# Patient Record
Sex: FEMALE | Race: WHITE | URBAN - METROPOLITAN AREA
[De-identification: names, ages, dates, MRNs, and addresses within clinical notes are randomized per-mention and may not be internally consistent; named-entity substitution may affect disease eponyms.]

---

## 2021-11-29 ENCOUNTER — OFFICE VISIT (OUTPATIENT)
Dept: URBAN - METROPOLITAN AREA CLINIC 128 | Facility: CLINIC | Age: 81
End: 2021-11-29

## 2022-08-31 ENCOUNTER — LAB OUTSIDE AN ENCOUNTER (OUTPATIENT)
Dept: URBAN - METROPOLITAN AREA CLINIC 128 | Facility: CLINIC | Age: 82
End: 2022-08-31

## 2022-08-31 ENCOUNTER — OFFICE VISIT (OUTPATIENT)
Dept: URBAN - METROPOLITAN AREA CLINIC 128 | Facility: CLINIC | Age: 82
End: 2022-08-31

## 2022-08-31 ENCOUNTER — WEB ENCOUNTER (OUTPATIENT)
Dept: URBAN - METROPOLITAN AREA CLINIC 128 | Facility: CLINIC | Age: 82
End: 2022-08-31

## 2022-08-31 ENCOUNTER — CLAIMS CREATED FROM THE CLAIM WINDOW (OUTPATIENT)
Dept: URBAN - METROPOLITAN AREA CLINIC 128 | Facility: CLINIC | Age: 82
End: 2022-08-31
Payer: MEDICARE

## 2022-08-31 VITALS
DIASTOLIC BLOOD PRESSURE: 76 MMHG | HEIGHT: 63 IN | WEIGHT: 89 LBS | HEART RATE: 90 BPM | SYSTOLIC BLOOD PRESSURE: 154 MMHG | BODY MASS INDEX: 15.77 KG/M2 | TEMPERATURE: 98.1 F

## 2022-08-31 DIAGNOSIS — K58.9 IRRITABLE BOWEL SYNDROME, UNSPECIFIED TYPE: ICD-10-CM

## 2022-08-31 DIAGNOSIS — R19.7 DIARRHEA, UNSPECIFIED TYPE: ICD-10-CM

## 2022-08-31 PROCEDURE — 99204 OFFICE O/P NEW MOD 45 MIN: CPT | Performed by: PHYSICIAN ASSISTANT

## 2022-08-31 RX ORDER — CHOLESTYRAMINE 4 G/9G
1 PACKET MIXED WITH WATER OR NON-CARBONATED DRINK POWDER, FOR SUSPENSION ORAL TWICE A DAY
Qty: 60 | Refills: 2 | OUTPATIENT
Start: 2022-08-31

## 2022-08-31 RX ORDER — RIFAXIMIN 550 MG/1
1 TABLET TABLET ORAL THREE TIMES A DAY
Qty: 42 TABLET | Refills: 0 | OUTPATIENT
Start: 2022-08-31 | End: 2022-09-14

## 2022-08-31 NOTE — HPI-TODAY'S VISIT:
The patient is a new patient with known IBS-D. She has 1-2 loose BMs a day. She tested positive for SIBO with her PCP but symptoms returned after taking flagyl and bactim for her SIBO. She had negative stool studies per patient. She went to [place] 1 month ago so her pcp did a stool study. She has had giardiasis in the remote past after going to [facility]. her last colonoscopy was at age 74. She had a negative cologuard test in 12/21. She had an overall negative food allergy panel except for mild shrimp allergy.

## 2022-09-01 LAB
A/G RATIO: 1.7
ABSOLUTE BASOPHILS: 38
ABSOLUTE EOSINOPHILS: 360
ABSOLUTE LYMPHOCYTES: 2115
ABSOLUTE MONOCYTES: 848
ABSOLUTE NEUTROPHILS: 4140
ALBUMIN: 4.2
ALKALINE PHOSPHATASE: 43
ALT (SGPT): 18
AST (SGOT): 28
BASOPHILS: 0.5
BILIRUBIN, TOTAL: 0.6
BUN/CREATININE RATIO: 40
BUN: 22
CALCIUM: 10.3
CARBON DIOXIDE, TOTAL: 27
CHLORIDE: 103
CREATININE: 0.55
EGFR: 91
EOSINOPHILS: 4.8
GLOBULIN, TOTAL: 2.5
GLUCOSE: 85
HEMATOCRIT: 38.9
HEMOGLOBIN: 13.1
IMMUNOGLOBULIN A: 214
INTERPRETATION: (no result)
LYMPHOCYTES: 28.2
MCH: 35.3
MCHC: 33.7
MCV: 104.9
MONOCYTES: 11.3
MPV: 10.4
NEUTROPHILS: 55.2
PLATELET COUNT: 222
POTASSIUM: 4.6
PROTEIN, TOTAL: 6.7
RDW: 12
RED BLOOD CELL COUNT: 3.71
SODIUM: 140
TISSUE TRANSGLUTAMINASE AB, IGA: <1
TSH: 1.18
WHITE BLOOD CELL COUNT: 7.5

## 2022-09-06 ENCOUNTER — P2P PATIENT RECORD (OUTPATIENT)
Age: 82
End: 2022-09-06

## 2022-09-08 ENCOUNTER — LAB OUTSIDE AN ENCOUNTER (OUTPATIENT)
Dept: URBAN - METROPOLITAN AREA CLINIC 128 | Facility: CLINIC | Age: 82
End: 2022-09-08

## 2022-09-14 LAB
CAMPYLOBACTER SPP. AG,EIA: (no result)
CLOSTRIDIUM DIFFICILE: NOT DETECTED
EIA (1): (no result)
FECAL FAT, QUALITATIVE: (no result)
LEUKOCYTES: (no result)
OVA AND PARASITES, CONC AND PERM SMEAR: (no result)
PANCREATIC ELASTASE, FECAL: 265
SALMONELLA AND SHIGELLA, CULTURE: (no result)
SHIGA TOXINS, EIA W/RFL TO E.COLI O157 CULTURE: (no result)
TOXIN A AND B: NOT DETECTED

## 2022-09-16 LAB
CAMPYLOBACTER SPP. AG,EIA: (no result)
CLOSTRIDIUM DIFFICILE: NOT DETECTED
EIA (1): (no result)
FECAL FAT, QUALITATIVE: (no result)
LEUKOCYTES: (no result)
OVA AND PARASITES, CONC AND PERM SMEAR: (no result)
PANCREATIC ELASTASE, FECAL: 283
SALMONELLA AND SHIGELLA, CULTURE: (no result)
SHIGA TOXINS, EIA W/RFL TO E.COLI O157 CULTURE: (no result)
TOXIN A AND B: NOT DETECTED

## 2022-10-25 ENCOUNTER — OFFICE VISIT (OUTPATIENT)
Dept: URBAN - METROPOLITAN AREA CLINIC 128 | Facility: CLINIC | Age: 82
End: 2022-10-25

## 2022-10-26 PROBLEM — 10743008: Status: ACTIVE | Noted: 2022-08-31

## 2022-10-27 ENCOUNTER — TELEPHONE ENCOUNTER (OUTPATIENT)
Dept: URBAN - METROPOLITAN AREA CLINIC 92 | Facility: CLINIC | Age: 82
End: 2022-10-27

## 2022-10-27 ENCOUNTER — CLAIMS CREATED FROM THE CLAIM WINDOW (OUTPATIENT)
Dept: URBAN - METROPOLITAN AREA TELEHEALTH 2 | Facility: TELEHEALTH | Age: 82
End: 2022-10-27
Payer: MEDICARE

## 2022-10-27 VITALS — WEIGHT: 98 LBS | BODY MASS INDEX: 17.36 KG/M2 | HEIGHT: 63 IN

## 2022-10-27 DIAGNOSIS — K58.9 IRRITABLE BOWEL SYNDROME, UNSPECIFIED TYPE: ICD-10-CM

## 2022-10-27 DIAGNOSIS — K58.0 IBS-D: ICD-10-CM

## 2022-10-27 DIAGNOSIS — R19.7 DIARRHEA, UNSPECIFIED TYPE: ICD-10-CM

## 2022-10-27 PROCEDURE — 99213 OFFICE O/P EST LOW 20 MIN: CPT | Performed by: PHYSICIAN ASSISTANT

## 2022-10-27 RX ORDER — CHOLESTYRAMINE 4 G/9G
1 PACKET MIXED WITH WATER OR NON-CARBONATED DRINK POWDER, FOR SUSPENSION ORAL TWICE A DAY
Qty: 60 | Refills: 2 | OUTPATIENT

## 2022-10-27 RX ORDER — CHOLESTYRAMINE 4 G/9G
1 PACKET MIXED WITH WATER OR NON-CARBONATED DRINK POWDER, FOR SUSPENSION ORAL TWICE A DAY
Qty: 60 | Refills: 2 | COMMUNITY
Start: 2022-08-31

## 2022-10-27 NOTE — HPI-TODAY'S VISIT:
The patient is following up on her IBS-D. She has 1 formed BMs a day now on the questran and s/p xifaxan treatment. She feels 100% better now. She tested positive for SIBO with her PCP but symptoms returned after taking flagyl and bactim for her SIBO. She had negative stool studies per patient. She went to [place] 1 month ago so her pcp did a stool study. She has had giardiasis in the remote past after going to [facility]. Her last colonoscopy was at age 74. She had a negative cologuard test in 12/21. She had an overall negative food allergy panel except for mild shrimp allergy.

## 2023-04-17 ENCOUNTER — TELEPHONE ENCOUNTER (OUTPATIENT)
Dept: URBAN - METROPOLITAN AREA CLINIC 19 | Facility: CLINIC | Age: 83
End: 2023-04-17

## 2023-04-17 RX ORDER — RIFAXIMIN 550 MG/1
1 TABLET TABLET ORAL THREE TIMES A DAY
Qty: 42 TABLET | Refills: 0 | OUTPATIENT
Start: 2023-04-17 | End: 2023-05-01

## 2023-04-21 ENCOUNTER — OFFICE VISIT (OUTPATIENT)
Dept: URBAN - METROPOLITAN AREA CLINIC 128 | Facility: CLINIC | Age: 83
End: 2023-04-21
Payer: MEDICARE

## 2023-04-21 VITALS
BODY MASS INDEX: 17.33 KG/M2 | WEIGHT: 97.8 LBS | HEIGHT: 63 IN | SYSTOLIC BLOOD PRESSURE: 150 MMHG | HEART RATE: 71 BPM | TEMPERATURE: 97.6 F | DIASTOLIC BLOOD PRESSURE: 68 MMHG

## 2023-04-21 DIAGNOSIS — K21.9 GASTROESOPHAGEAL REFLUX DISEASE WITHOUT ESOPHAGITIS: ICD-10-CM

## 2023-04-21 DIAGNOSIS — C50.819 MALIGNANT NEOPLASM OF OVERLAPPING SITES OF UNSPECIFIED FEMALE BREAST: ICD-10-CM

## 2023-04-21 DIAGNOSIS — K58.2 IRRITABLE BOWEL SYNDROME WITH ALTERNATING BOWEL HABITS: ICD-10-CM

## 2023-04-21 PROBLEM — 372064008: Status: ACTIVE | Noted: 2023-04-21

## 2023-04-21 PROBLEM — 266435005: Status: ACTIVE | Noted: 2023-04-21

## 2023-04-21 PROCEDURE — 99213 OFFICE O/P EST LOW 20 MIN: CPT | Performed by: PHYSICIAN ASSISTANT

## 2023-04-21 RX ORDER — CHOLESTYRAMINE 4 G/9G
1 PACKET MIXED WITH WATER OR NON-CARBONATED DRINK POWDER, FOR SUSPENSION ORAL TWICE A DAY
Qty: 60 | Refills: 2 | OUTPATIENT

## 2023-04-21 RX ORDER — RIFAXIMIN 550 MG/1
1 TABLET TABLET ORAL THREE TIMES A DAY
Qty: 42 TABLET | Refills: 0 | Status: ON HOLD | COMMUNITY
Start: 2023-04-17 | End: 2023-05-01

## 2023-04-21 RX ORDER — CHOLESTYRAMINE 4 G/9G
1 PACKET MIXED WITH WATER OR NON-CARBONATED DRINK POWDER, FOR SUSPENSION ORAL TWICE A DAY
Qty: 60 | Refills: 2 | Status: ACTIVE | COMMUNITY

## 2023-04-21 RX ORDER — FAMOTIDINE 20 MG/1
1 TABLET AT BEDTIME TABLET, FILM COATED ORAL ONCE A DAY
Qty: 30 TABLET | Refills: 5 | OUTPATIENT
Start: 2023-04-21

## 2023-04-21 NOTE — PHYSICAL EXAM GASTROINTESTINAL
Abdomen , soft, nontender, nondistended , no guarding or rigidity , no masses palpable , normal bowel sounds, negative Monroe's sign. negative CVA tenderness bilaterally Liver and Spleen , no hepatosplenomegaly Rectal deferred

## 2023-04-21 NOTE — HPI-TODAY'S VISIT:
The patient is following up on her IBS-D. She has 1 formed BMs a day now on the questran and s/p xifaxan treatment. She feels 100% better now. She tested positive for SIBO with her PCP but symptoms returned after taking flagyl and bactim for her SIBO. She had negative stool studies per patient. She went to [place] last year so her pcp did a stool study. She has had giardiasis in the remote past after going to [facility]. Her last colonoscopy was at age 74. She had a negative cologuard test in 12/21. She had an overall negative food allergy panel except for mild shrimp allergy. She was taking  omeprazole for acid reflux symptoms but then she stopped it.

## 2023-04-24 ENCOUNTER — TELEPHONE ENCOUNTER (OUTPATIENT)
Dept: URBAN - METROPOLITAN AREA CLINIC 128 | Facility: CLINIC | Age: 83
End: 2023-04-24

## 2023-04-24 RX ORDER — DICYCLOMINE HYDROCHLORIDE 10 MG/1
1 CAPSULE CAPSULE ORAL
Qty: 30 | Refills: 0 | OUTPATIENT
Start: 2023-04-26 | End: 2023-05-26

## 2023-05-30 ENCOUNTER — TELEPHONE ENCOUNTER (OUTPATIENT)
Dept: URBAN - METROPOLITAN AREA CLINIC 128 | Facility: CLINIC | Age: 83
End: 2023-05-30

## 2023-05-30 RX ORDER — CHOLESTYRAMINE 4 G/9G
1 PACKET MIXED WITH WATER OR NON-CARBONATED DRINK POWDER, FOR SUSPENSION ORAL ONCE A DAY
Qty: 30 | Refills: 2 | OUTPATIENT
Start: 2023-05-30

## 2023-06-13 ENCOUNTER — OFFICE VISIT (OUTPATIENT)
Dept: URBAN - METROPOLITAN AREA CLINIC 128 | Facility: CLINIC | Age: 83
End: 2023-06-13
Payer: MEDICARE

## 2023-06-13 ENCOUNTER — DASHBOARD ENCOUNTERS (OUTPATIENT)
Age: 83
End: 2023-06-13

## 2023-06-13 VITALS
BODY MASS INDEX: 17.47 KG/M2 | WEIGHT: 98.6 LBS | SYSTOLIC BLOOD PRESSURE: 136 MMHG | HEART RATE: 74 BPM | HEIGHT: 63 IN | DIASTOLIC BLOOD PRESSURE: 66 MMHG | TEMPERATURE: 97.7 F

## 2023-06-13 DIAGNOSIS — C50.919 MALIGNANT NEOPLASM OF FEMALE BREAST, UNSPECIFIED ESTROGEN RECEPTOR STATUS, UNSPECIFIED LATERALITY, UNSPECIFIED SITE OF BREAST: ICD-10-CM

## 2023-06-13 DIAGNOSIS — K58.8 OTHER IRRITABLE BOWEL SYNDROME: ICD-10-CM

## 2023-06-13 DIAGNOSIS — K21.9 GASTROESOPHAGEAL REFLUX DISEASE WITHOUT ESOPHAGITIS: ICD-10-CM

## 2023-06-13 DIAGNOSIS — R19.7 DIARRHEA, UNSPECIFIED TYPE: ICD-10-CM

## 2023-06-13 PROCEDURE — 99213 OFFICE O/P EST LOW 20 MIN: CPT | Performed by: PHYSICIAN ASSISTANT

## 2023-06-13 RX ORDER — FAMOTIDINE 20 MG/1
1 TABLET AT BEDTIME TABLET, FILM COATED ORAL ONCE A DAY
Qty: 30 TABLET | Refills: 5 | Status: ON HOLD | COMMUNITY
Start: 2023-04-21

## 2023-06-13 RX ORDER — CHOLESTYRAMINE 4 G/9G
1 PACKET MIXED WITH WATER OR NON-CARBONATED DRINK POWDER, FOR SUSPENSION ORAL TWICE A DAY
Qty: 60 | Refills: 2 | OUTPATIENT

## 2023-06-13 RX ORDER — CHOLESTYRAMINE 4 G/9G
1 PACKET MIXED WITH WATER OR NON-CARBONATED DRINK POWDER, FOR SUSPENSION ORAL ONCE A DAY
Qty: 30 | Refills: 2 | Status: ACTIVE | COMMUNITY
Start: 2023-05-30

## 2023-06-13 RX ORDER — FAMOTIDINE 20 MG/1
1 TABLET AT BEDTIME TABLET, FILM COATED ORAL ONCE A DAY
Qty: 30 TABLET | Refills: 5 | OUTPATIENT

## 2023-06-13 NOTE — HPI-TODAY'S VISIT:
The patient is following up on her IBS-D. She has 1 formed BMs a day now on the questran and s/p xifaxan treatment. She feels 100% better now. She tested positive for SIBO with her PCP but symptoms returned after taking flagyl and bactim for her SIBO. She had negative stool studies per patient. She went to [place] last year so her pcp did a stool study. She has had giardiasis in the remote past after going to [facility]. Her last colonoscopy was at age 74. She had a negative cologuard test in 12/21. She had an overall negative food allergy panel except for mild shrimp allergy. She was taking  omeprazole for acid reflux symptoms but then she stopped it. She is off of famotidine now as her GERD symptoms are resolved now.

## 2023-10-31 ENCOUNTER — TELEPHONE ENCOUNTER (OUTPATIENT)
Dept: URBAN - METROPOLITAN AREA CLINIC 128 | Facility: CLINIC | Age: 83
End: 2023-10-31

## 2024-07-08 ENCOUNTER — OFFICE VISIT (OUTPATIENT)
Dept: URBAN - METROPOLITAN AREA CLINIC 128 | Facility: CLINIC | Age: 84
End: 2024-07-08
Payer: MEDICARE

## 2024-07-08 VITALS
DIASTOLIC BLOOD PRESSURE: 68 MMHG | HEIGHT: 63 IN | WEIGHT: 96.2 LBS | HEART RATE: 74 BPM | TEMPERATURE: 97.2 F | BODY MASS INDEX: 17.05 KG/M2 | SYSTOLIC BLOOD PRESSURE: 112 MMHG

## 2024-07-08 DIAGNOSIS — K58.0 IBS-D: ICD-10-CM

## 2024-07-08 DIAGNOSIS — C50.919 MALIGNANT NEOPLASM OF FEMALE BREAST, UNSPECIFIED ESTROGEN RECEPTOR STATUS, UNSPECIFIED LATERALITY, UNSPECIFIED SITE OF BREAST: ICD-10-CM

## 2024-07-08 DIAGNOSIS — K21.9 GASTROESOPHAGEAL REFLUX DISEASE WITHOUT ESOPHAGITIS: ICD-10-CM

## 2024-07-08 PROCEDURE — 99213 OFFICE O/P EST LOW 20 MIN: CPT | Performed by: PHYSICIAN ASSISTANT

## 2024-07-08 RX ORDER — FAMOTIDINE 20 MG/1
1 TABLET AT BEDTIME TABLET, FILM COATED ORAL ONCE A DAY
Qty: 30 TABLET | Refills: 5 | OUTPATIENT

## 2024-07-08 RX ORDER — FAMOTIDINE 20 MG/1
TAKE ONE TABLET OR CAPSULE BY MOUTH AT BEDTIME TABLET ORAL
Qty: 30 TABLET | Refills: 0 | Status: ACTIVE | COMMUNITY

## 2024-07-08 RX ORDER — CHOLESTYRAMINE 4 G/9G
1 PACKET MIXED WITH WATER OR NON-CARBONATED DRINK POWDER, FOR SUSPENSION ORAL ONCE A DAY
Qty: 30 | Refills: 2 | Status: ACTIVE | COMMUNITY
Start: 2023-05-30

## 2024-07-08 NOTE — HPI-TODAY'S VISIT:
The patient is following up on her IBS-D. She has 1 formed BMs a day now on the questran as needed and s/p xifaxan treatment. She feels 100% better now. She tested positive for SIBO with her PCP but symptoms returned after taking flagyl for her SIBO. She had negative stool studies per patient. She went to [place] last year so her pcp did a stool study. She has had giardiasis in the remote past after going to [facility]. Her last colonoscopy was at age 74. She had a negative cologuard test in 12/21. She had an overall negative food allergy panel except for mild shrimp allergy. She was taking  omeprazole for acid reflux symptoms but then she stopped it. She is off of famotidine now as her GERD symptoms are resolved now.

## 2024-08-29 ENCOUNTER — TELEPHONE ENCOUNTER (OUTPATIENT)
Dept: URBAN - METROPOLITAN AREA CLINIC 128 | Facility: CLINIC | Age: 84
End: 2024-08-29

## 2024-08-29 RX ORDER — CHOLESTYRAMINE 4 G/9G
1 PACKET MIXED WITH WATER OR NON-CARBONATED DRINK POWDER, FOR SUSPENSION ORAL TWICE A DAY
Qty: 180 | Refills: 3 | OUTPATIENT
Start: 2024-08-30